# Patient Record
(demographics unavailable — no encounter records)

---

## 2024-11-05 NOTE — PHYSICAL EXAM
[TextEntry] : PHYSICAL EXAM  General: The patient was alert, oriented and in no distress. Voice was clear.  Face: The patient had no facial asymmetry or mass. The skin was unremarkable.  Ears: Hearing normal to conversational voice External ears were normal without deformity. Ear canals: Scant cerumen was removed atraumatically under the microscope with a curette.  Canals were otherwise normal Tympanic membranes were intact and normal. No perforation or effusion. mobile  Nose:  The external nose had no significant deformity. On anterior rhinoscopy, the nasal mucosa was clear.  The anterior septum was grossly midline. There were no visualized polyps, purulence  or masses.   Oral cavity: Oral mucosa- normal. Oral and base of tongue- clear and without mass. Gingival and buccal mucosa- moist and without lesions. Palate- the palate moved well. There was no cleft palate. There appeared to be good salivary flow.   Oral cavity/oropharynx- no pus, erythema or mass  Neck:  The neck was symmetrical. The parotid and submandibular glands were normal without masses. The trachea was midline and there was no unusual crepitus. Thyroid was smooth and nontender and no masses were palpated. No masses  Lymphatics: Cervical adenopathy- none.    AUDIOGRAM- test ordered and the results reviewed and discussed with the patient.  It was compared with her prior audiogram Hearing-mild right high-frequency sensorineural hearing loss-essentially stable although possible slight decline at 1 frequent Word recognition-he normal Tympanograms- AD- type A, AS- type A

## 2024-11-05 NOTE — HISTORY OF PRESENT ILLNESS
[de-identified] : - 4/2/24-  VIC NATION is a 48 year old patient here for follow up for a small acoustic neuroma. She has seen Dr. Verma, Dr. Hayes and Dr. Street. She is currently observing it. she has tinnitus and a little right ear discomfort but no dizziness or otorrhea. She does not her hearing has changed. She said she had a follow up MRI in December which may have showed a little growth. She said she was told it was not that much.  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 - [FreeTextEntry1] : - - 11/5/24- VIC NATION is a 49 year old patient With a small right acoustic neuroma which causes a mild asymmetric hearing loss in the right ear.  She is here today for urgent evaluation.  She had an episode of fullness in the right ear with tinnitus while sleeping approximately 2 days ago.  She does not think it lasted long.  It has improved.  She has no otalgia or dizziness.  She does not have significant nasal congestion or obstruction.  She is currently being followed by Dr. Hayes at [place] and Dr. Jensen at [place].  She will be getting a follow-up MRI in December

## 2024-11-05 NOTE — ASSESSMENT
[FreeTextEntry1] : She has a history of a right acoustic neuroma.  She is followed by Dr. Hayes and Dr. Jensen.  She had an episode of right ear fullness and tinnitus the other night.  It has since improved.  Her ears were normal on exam.  She only had scant cerumen.  Audiogram showed essentially stable right high-frequency sensorineural hearing loss.  There may have been a slight decline in 1 frequency.  Plan -Findings and management options were discussed with the patient. - Continue good ear hygiene - Monitor hearing - I have suggested she follow-up with her otologist - She could consider a short burst of prednisone.  She is going to hold off for now - She is scheduled for follow-up MRI in December - I have asked her to call me and return if she has any problems or worsening symptoms